# Patient Record
Sex: FEMALE | URBAN - METROPOLITAN AREA
[De-identification: names, ages, dates, MRNs, and addresses within clinical notes are randomized per-mention and may not be internally consistent; named-entity substitution may affect disease eponyms.]

---

## 2022-08-26 ENCOUNTER — OFFICE (OUTPATIENT)
Dept: URBAN - METROPOLITAN AREA CLINIC 90 | Facility: CLINIC | Age: 87
Setting detail: OPHTHALMOLOGY
End: 2022-08-26
Payer: COMMERCIAL

## 2022-08-26 DIAGNOSIS — H25.13: ICD-10-CM

## 2022-08-26 PROBLEM — H40.013 GLAUCOMA SUSPECT, LOW RISK 1-2 FACTORS; BOTH EYES: Status: ACTIVE | Noted: 2022-08-26

## 2022-08-26 PROCEDURE — 92004 COMPRE OPH EXAM NEW PT 1/>: CPT | Performed by: OPHTHALMOLOGY

## 2022-08-26 ASSESSMENT — TONOMETRY
OS_IOP_MMHG: 21
OD_IOP_MMHG: 21

## 2022-08-26 ASSESSMENT — REFRACTION_CURRENTRX
OD_OVR_VA: 20/
OD_CYLINDER: -0.75
OS_AXIS: 093
OS_VPRISM_DIRECTION: BF
OS_OVR_VA: 20/
OD_ADD: +3.75
OS_SPHERE: -4.00
OD_VPRISM_DIRECTION: BF
OS_CYLINDER: -1.00
OS_ADD: +3.75
OD_AXIS: 100
OD_SPHERE: -4.00

## 2022-08-26 ASSESSMENT — KERATOMETRY
OS_K1POWER_DIOPTERS: 42.50
OD_K2POWER_DIOPTERS: 43.75
OD_K1POWER_DIOPTERS: 42.50
METHOD_AUTO_MANUAL: AUTO
OS_K2POWER_DIOPTERS: 43.75
OS_AXISANGLE_DEGREES: 172
OD_AXISANGLE_DEGREES: 008

## 2022-08-26 ASSESSMENT — REFRACTION_AUTOREFRACTION
OS_SPHERE: -2.75
OS_CYLINDER: -5.00
OD_CYLINDER: -2.50
OS_AXIS: 084
OD_SPHERE: -5.25
OD_AXIS: 084

## 2022-08-26 ASSESSMENT — AXIALLENGTH_DERIVED
OD_AL: 26.6029
OS_AL: 25.9977

## 2022-08-26 ASSESSMENT — SPHEQUIV_DERIVED
OD_SPHEQUIV: -6.5
OS_SPHEQUIV: -5.25

## 2022-08-26 ASSESSMENT — CONFRONTATIONAL VISUAL FIELD TEST (CVF)
OD_FINDINGS: FULL
OS_FINDINGS: FULL

## 2022-08-26 ASSESSMENT — VISUAL ACUITY
OS_BCVA: 20/200+1
OD_BCVA: 20/50-2

## 2023-01-04 ENCOUNTER — OFFICE (OUTPATIENT)
Dept: URBAN - METROPOLITAN AREA CLINIC 90 | Facility: CLINIC | Age: 88
Setting detail: OPHTHALMOLOGY
End: 2023-01-04
Payer: COMMERCIAL

## 2023-01-04 DIAGNOSIS — H40.013: ICD-10-CM

## 2023-01-04 DIAGNOSIS — H25.13: ICD-10-CM

## 2023-01-04 PROCEDURE — 92012 INTRM OPH EXAM EST PATIENT: CPT | Performed by: OPHTHALMOLOGY

## 2023-01-04 ASSESSMENT — REFRACTION_CURRENTRX
OS_CYLINDER: -1.00
OS_ADD: +3.75
OD_SPHERE: -4.00
OD_OVR_VA: 20/
OD_VPRISM_DIRECTION: BF
OS_SPHERE: -4.00
OS_AXIS: 093
OS_OVR_VA: 20/
OS_VPRISM_DIRECTION: BF
OD_AXIS: 100
OD_ADD: +3.75
OD_CYLINDER: -0.75

## 2023-01-04 ASSESSMENT — REFRACTION_AUTOREFRACTION
OS_AXIS: 083
OD_AXIS: 085
OD_CYLINDER: -4.00
OS_CYLINDER: -3.75
OD_SPHERE: -3.00
OS_SPHERE: -3.75

## 2023-01-04 ASSESSMENT — SPHEQUIV_DERIVED
OD_SPHEQUIV: -5
OS_SPHEQUIV: -5.625

## 2023-01-04 ASSESSMENT — KERATOMETRY
OD_AXISANGLE_DEGREES: 005
OS_K2POWER_DIOPTERS: 43.75
OS_AXISANGLE_DEGREES: 170
METHOD_AUTO_MANUAL: AUTO
OD_K2POWER_DIOPTERS: 44.00
OD_K1POWER_DIOPTERS: 42.75
OS_K1POWER_DIOPTERS: 42.00

## 2023-01-04 ASSESSMENT — TONOMETRY
OS_IOP_MMHG: 19
OD_IOP_MMHG: 19

## 2023-01-04 ASSESSMENT — AXIALLENGTH_DERIVED
OS_AL: 26.2899
OD_AL: 25.7699

## 2023-01-04 ASSESSMENT — VISUAL ACUITY
OD_BCVA: 20/50-2
OS_BCVA: 20/200+1

## 2023-01-04 ASSESSMENT — CONFRONTATIONAL VISUAL FIELD TEST (CVF)
OS_FINDINGS: FULL
OD_FINDINGS: FULL

## 2023-02-27 ENCOUNTER — AMBULATORY SURGERY CENTER (OUTPATIENT)
Dept: URBAN - METROPOLITAN AREA CLINIC 91 | Facility: CLINIC | Age: 88
Setting detail: OPHTHALMOLOGY
End: 2023-02-27
Payer: COMMERCIAL

## 2023-02-27 DIAGNOSIS — H25.21: ICD-10-CM

## 2023-02-27 PROCEDURE — 66984 XCAPSL CTRC RMVL W/O ECP: CPT | Performed by: OPHTHALMOLOGY

## 2023-02-28 ENCOUNTER — OFFICE (OUTPATIENT)
Dept: URBAN - METROPOLITAN AREA CLINIC 90 | Facility: CLINIC | Age: 88
Setting detail: OPHTHALMOLOGY
End: 2023-02-28
Payer: COMMERCIAL

## 2023-02-28 DIAGNOSIS — Z96.1: ICD-10-CM

## 2023-02-28 PROCEDURE — 99024 POSTOP FOLLOW-UP VISIT: CPT | Performed by: OPHTHALMOLOGY

## 2023-02-28 ASSESSMENT — REFRACTION_CURRENTRX
OS_AXIS: 093
OS_SPHERE: -4.00
OS_CYLINDER: -1.00
OD_VPRISM_DIRECTION: BF
OD_SPHERE: -4.00
OS_OVR_VA: 20/
OS_VPRISM_DIRECTION: BF
OD_AXIS: 100
OD_ADD: +3.75
OD_OVR_VA: 20/
OD_CYLINDER: -0.75
OS_ADD: +3.75

## 2023-02-28 ASSESSMENT — REFRACTION_AUTOREFRACTION
OS_SPHERE: -3.75
OS_AXIS: 083
OD_AXIS: 085
OD_SPHERE: -3.00
OS_CYLINDER: -3.75
OD_CYLINDER: -4.00

## 2023-02-28 ASSESSMENT — VISUAL ACUITY
OD_BCVA: 20/200
OS_BCVA: 20/400

## 2023-02-28 ASSESSMENT — CORNEAL EDEMA - FOLDS/STRIAE: OD_FOLDSSTRIAE: 3+

## 2023-02-28 ASSESSMENT — SPHEQUIV_DERIVED
OD_SPHEQUIV: -5
OS_SPHEQUIV: -5.625

## 2023-03-07 ENCOUNTER — RX ONLY (RX ONLY)
Age: 88
End: 2023-03-07

## 2023-03-07 ENCOUNTER — OFFICE (OUTPATIENT)
Dept: URBAN - METROPOLITAN AREA CLINIC 90 | Facility: CLINIC | Age: 88
Setting detail: OPHTHALMOLOGY
End: 2023-03-07
Payer: COMMERCIAL

## 2023-03-07 DIAGNOSIS — Z96.1: ICD-10-CM

## 2023-03-07 PROBLEM — H25.11 CATARACT SENILE NUCLEAR SCLEROSIS; RIGHT EYE: Status: ACTIVE | Noted: 2023-02-28

## 2023-03-07 PROCEDURE — 99024 POSTOP FOLLOW-UP VISIT: CPT | Performed by: OPHTHALMOLOGY

## 2023-03-07 ASSESSMENT — REFRACTION_CURRENTRX
OS_VPRISM_DIRECTION: BF
OS_ADD: +3.75
OD_CYLINDER: -0.75
OS_AXIS: 093
OS_OVR_VA: 20/
OD_SPHERE: -4.00
OS_CYLINDER: -1.00
OD_VPRISM_DIRECTION: BF
OD_AXIS: 100
OD_ADD: +3.75
OD_OVR_VA: 20/
OS_SPHERE: -4.00

## 2023-03-07 ASSESSMENT — KERATOMETRY
OS_AXISANGLE_DEGREES: 170
METHOD_AUTO_MANUAL: AUTO
OD_K2POWER_DIOPTERS: 44.00
OS_K1POWER_DIOPTERS: 42.00
OS_K2POWER_DIOPTERS: 43.75
OD_K1POWER_DIOPTERS: 42.75
OD_AXISANGLE_DEGREES: 005

## 2023-03-07 ASSESSMENT — SPHEQUIV_DERIVED
OD_SPHEQUIV: -5
OS_SPHEQUIV: -5.625

## 2023-03-07 ASSESSMENT — TONOMETRY: OD_IOP_MMHG: 10

## 2023-03-07 ASSESSMENT — REFRACTION_AUTOREFRACTION
OD_CYLINDER: -4.00
OD_AXIS: 085
OD_SPHERE: -3.00
OS_CYLINDER: -3.75
OS_AXIS: 083
OS_SPHERE: -3.75

## 2023-03-07 ASSESSMENT — CONFRONTATIONAL VISUAL FIELD TEST (CVF)
OD_FINDINGS: FULL
OS_FINDINGS: FULL

## 2023-03-07 ASSESSMENT — AXIALLENGTH_DERIVED
OD_AL: 25.7699
OS_AL: 26.2899

## 2023-03-07 ASSESSMENT — VISUAL ACUITY
OS_BCVA: 20/150-1
OD_BCVA: 20/100-1

## 2023-03-28 ENCOUNTER — OFFICE (OUTPATIENT)
Dept: URBAN - METROPOLITAN AREA CLINIC 90 | Facility: CLINIC | Age: 88
Setting detail: OPHTHALMOLOGY
End: 2023-03-28
Payer: COMMERCIAL

## 2023-03-28 DIAGNOSIS — Z96.1: ICD-10-CM

## 2023-03-28 DIAGNOSIS — H25.12: ICD-10-CM

## 2023-03-28 PROCEDURE — 99024 POSTOP FOLLOW-UP VISIT: CPT | Performed by: OPHTHALMOLOGY

## 2023-03-28 ASSESSMENT — REFRACTION_CURRENTRX
OD_ADD: +3.75
OS_ADD: +3.75
OS_OVR_VA: 20/
OD_AXIS: 100
OS_AXIS: 093
OS_SPHERE: -4.00
OD_VPRISM_DIRECTION: BF
OD_OVR_VA: 20/
OS_CYLINDER: -1.00
OD_SPHERE: -4.00
OS_VPRISM_DIRECTION: BF
OD_CYLINDER: -0.75

## 2023-03-28 ASSESSMENT — CONFRONTATIONAL VISUAL FIELD TEST (CVF)
OS_FINDINGS: FULL
OD_FINDINGS: FULL

## 2023-03-28 ASSESSMENT — KERATOMETRY
OS_K1POWER_DIOPTERS: 42.25
METHOD_AUTO_MANUAL: AUTO
OS_K2POWER_DIOPTERS: 43.75
OD_K2POWER_DIOPTERS: 42.00
OS_AXISANGLE_DEGREES: 175
OD_AXISANGLE_DEGREES: 024
OD_K1POWER_DIOPTERS: 41.00

## 2023-03-28 ASSESSMENT — REFRACTION_AUTOREFRACTION
OS_AXIS: 092
OD_AXIS: 107
OS_CYLINDER: -4.75
OD_CYLINDER: -1.75
OD_SPHERE: -3.00
OS_SPHERE: -3.50

## 2023-03-28 ASSESSMENT — TONOMETRY: OD_IOP_MMHG: 18

## 2023-03-28 ASSESSMENT — SPHEQUIV_DERIVED
OD_SPHEQUIV: -3.875
OS_SPHEQUIV: -5.875

## 2023-03-28 ASSESSMENT — AXIALLENGTH_DERIVED
OS_AL: 26.354
OD_AL: 26.0722

## 2023-03-28 ASSESSMENT — VISUAL ACUITY
OD_BCVA: 20/100-1
OS_BCVA: 20/70+1

## 2023-04-06 ENCOUNTER — OFFICE (OUTPATIENT)
Dept: URBAN - METROPOLITAN AREA CLINIC 90 | Facility: CLINIC | Age: 88
Setting detail: OPHTHALMOLOGY
End: 2023-04-06
Payer: COMMERCIAL

## 2023-04-06 DIAGNOSIS — H25.12: ICD-10-CM

## 2023-04-06 PROCEDURE — 99024 POSTOP FOLLOW-UP VISIT: CPT | Performed by: OPHTHALMOLOGY

## 2023-04-06 PROCEDURE — 92136 OPHTHALMIC BIOMETRY: CPT | Performed by: OPHTHALMOLOGY

## 2023-04-06 ASSESSMENT — KERATOMETRY
OS_AXISANGLE_DEGREES: 175
OD_K2POWER_DIOPTERS: 42.00
OS_K1POWER_DIOPTERS: 42.25
OD_AXISANGLE_DEGREES: 024
OD_K1POWER_DIOPTERS: 41.00
OS_K2POWER_DIOPTERS: 43.75
METHOD_AUTO_MANUAL: AUTO

## 2023-04-06 ASSESSMENT — REFRACTION_CURRENTRX
OS_VPRISM_DIRECTION: BF
OD_CYLINDER: -0.75
OS_AXIS: 093
OS_OVR_VA: 20/
OS_SPHERE: -4.00
OD_AXIS: 100
OS_ADD: +3.75
OD_OVR_VA: 20/
OS_CYLINDER: -1.00
OD_ADD: +3.75
OD_SPHERE: -4.00
OD_VPRISM_DIRECTION: BF

## 2023-04-06 ASSESSMENT — REFRACTION_AUTOREFRACTION
OD_AXIS: 107
OD_SPHERE: -3.00
OS_AXIS: 092
OS_SPHERE: -3.50
OD_CYLINDER: -1.75
OS_CYLINDER: -4.75

## 2023-04-06 ASSESSMENT — SPHEQUIV_DERIVED
OS_SPHEQUIV: -5.875
OD_SPHEQUIV: -3.875

## 2023-04-06 ASSESSMENT — AXIALLENGTH_DERIVED
OS_AL: 26.354
OD_AL: 26.0722

## 2023-04-06 ASSESSMENT — VISUAL ACUITY
OS_BCVA: 20/80+2
OD_BCVA: 20/70

## 2023-04-21 ENCOUNTER — AMBULATORY SURGERY CENTER (OUTPATIENT)
Dept: URBAN - METROPOLITAN AREA SURGERY 25 | Facility: SURGERY | Age: 88
Setting detail: OPHTHALMOLOGY
End: 2023-04-21
Payer: COMMERCIAL

## 2023-04-21 DIAGNOSIS — H25.22: ICD-10-CM

## 2023-04-21 PROCEDURE — 66984 XCAPSL CTRC RMVL W/O ECP: CPT | Performed by: OPHTHALMOLOGY

## 2023-04-22 ENCOUNTER — OFFICE (OUTPATIENT)
Dept: URBAN - METROPOLITAN AREA CLINIC 90 | Facility: CLINIC | Age: 88
Setting detail: OPHTHALMOLOGY
End: 2023-04-22
Payer: COMMERCIAL

## 2023-04-22 DIAGNOSIS — Z96.1: ICD-10-CM

## 2023-04-22 PROCEDURE — 99024 POSTOP FOLLOW-UP VISIT: CPT | Performed by: OPHTHALMOLOGY

## 2023-04-22 ASSESSMENT — REFRACTION_AUTOREFRACTION
OS_AXIS: 092
OD_SPHERE: -3.00
OD_AXIS: 107
OS_CYLINDER: -4.75
OS_SPHERE: -3.50
OD_CYLINDER: -1.75

## 2023-04-22 ASSESSMENT — REFRACTION_CURRENTRX
OS_ADD: +3.75
OS_VPRISM_DIRECTION: BF
OD_AXIS: 100
OS_OVR_VA: 20/
OD_ADD: +3.75
OD_OVR_VA: 20/
OD_CYLINDER: -0.75
OS_CYLINDER: -1.00
OD_VPRISM_DIRECTION: BF
OS_AXIS: 093
OS_SPHERE: -4.00
OD_SPHERE: -4.00

## 2023-04-22 ASSESSMENT — CORNEAL EDEMA - FOLDS/STRIAE: OS_FOLDSSTRIAE: 3+

## 2023-04-22 ASSESSMENT — KERATOMETRY
OD_K2POWER_DIOPTERS: 42.00
OS_AXISANGLE_DEGREES: 175
METHOD_AUTO_MANUAL: AUTO
OD_K1POWER_DIOPTERS: 41.00
OS_K2POWER_DIOPTERS: 43.75
OS_K1POWER_DIOPTERS: 42.25
OD_AXISANGLE_DEGREES: 024

## 2023-04-22 ASSESSMENT — CORNEAL EDEMA CLINICAL DESCRIPTION: OS_CORNEALEDEMA: DEEP FOLDS

## 2023-04-22 ASSESSMENT — AXIALLENGTH_DERIVED
OD_AL: 26.0722
OS_AL: 26.354

## 2023-04-22 ASSESSMENT — VISUAL ACUITY
OS_BCVA: 20/70
OD_BCVA: 20/200

## 2023-04-22 ASSESSMENT — SPHEQUIV_DERIVED
OS_SPHEQUIV: -5.875
OD_SPHEQUIV: -3.875

## 2023-04-28 ENCOUNTER — RX ONLY (RX ONLY)
Age: 88
End: 2023-04-28

## 2023-04-28 ENCOUNTER — OFFICE (OUTPATIENT)
Dept: URBAN - METROPOLITAN AREA CLINIC 90 | Facility: CLINIC | Age: 88
Setting detail: OPHTHALMOLOGY
End: 2023-04-28
Payer: COMMERCIAL

## 2023-04-28 DIAGNOSIS — Z96.1: ICD-10-CM

## 2023-04-28 PROBLEM — H18.591 UNSPECIFIED HEREDITARY CORNEAL DYSTROPHIES ; LEFT EYE: Status: ACTIVE | Noted: 2023-04-28

## 2023-04-28 PROBLEM — H18.593 UNSPECIFIED HEREDITARY CORNEAL DYSTROPHIES ; LEFT EYE: Status: ACTIVE | Noted: 2023-04-28

## 2023-04-28 PROBLEM — H18.592 UNSPECIFIED HEREDITARY CORNEAL DYSTROPHIES ; LEFT EYE: Status: ACTIVE | Noted: 2023-04-28

## 2023-04-28 PROCEDURE — 99024 POSTOP FOLLOW-UP VISIT: CPT | Performed by: OPHTHALMOLOGY

## 2023-04-28 ASSESSMENT — CONFRONTATIONAL VISUAL FIELD TEST (CVF)
OD_FINDINGS: FULL
OS_FINDINGS: FULL

## 2023-04-28 ASSESSMENT — KERATOMETRY
OS_K1POWER_DIOPTERS: 42.25
OD_K1POWER_DIOPTERS: 41.00
OS_AXISANGLE_DEGREES: 175
OS_K2POWER_DIOPTERS: 43.75
OD_K2POWER_DIOPTERS: 42.00
OD_AXISANGLE_DEGREES: 024

## 2023-04-28 ASSESSMENT — VISUAL ACUITY
OD_BCVA: 20/100+1
OS_BCVA: 20/80-1

## 2023-04-28 ASSESSMENT — SPHEQUIV_DERIVED
OS_SPHEQUIV: -5.875
OD_SPHEQUIV: -3.875

## 2023-04-28 ASSESSMENT — REFRACTION_AUTOREFRACTION
OS_AXIS: 092
OD_AXIS: 107
OD_SPHERE: -3.00
OS_CYLINDER: -4.75
OD_CYLINDER: -1.75
OS_SPHERE: -3.50

## 2023-04-28 ASSESSMENT — REFRACTION_CURRENTRX
OD_ADD: +3.75
OD_SPHERE: -4.00
OD_AXIS: 100
OD_OVR_VA: 20/
OS_AXIS: 093
OS_VPRISM_DIRECTION: BF
OD_CYLINDER: -0.75
OD_VPRISM_DIRECTION: BF
OS_OVR_VA: 20/
OS_SPHERE: -4.00
OS_CYLINDER: -1.00
OS_ADD: +3.75

## 2023-04-28 ASSESSMENT — AXIALLENGTH_DERIVED
OS_AL: 26.354
OD_AL: 26.0722

## 2023-04-28 ASSESSMENT — CORNEAL EDEMA - FOLDS/STRIAE: OS_FOLDSSTRIAE: 2+

## 2023-04-28 ASSESSMENT — TONOMETRY: OS_IOP_MMHG: 18

## 2023-05-19 ENCOUNTER — OFFICE (OUTPATIENT)
Dept: URBAN - METROPOLITAN AREA CLINIC 90 | Facility: CLINIC | Age: 88
Setting detail: OPHTHALMOLOGY
End: 2023-05-19
Payer: COMMERCIAL

## 2023-05-19 DIAGNOSIS — Z96.1: ICD-10-CM

## 2023-05-19 PROBLEM — H18.591 UNSPECIFIED HEREDITARY CORNEAL DYSTROPHIES ; LEFT EYE: Status: ACTIVE | Noted: 2023-05-19

## 2023-05-19 PROBLEM — H18.593 UNSPECIFIED HEREDITARY CORNEAL DYSTROPHIES ; LEFT EYE: Status: ACTIVE | Noted: 2023-05-19

## 2023-05-19 PROBLEM — H18.232 SECONDARY CORNEAL EDEMA; LEFT EYE: Status: ACTIVE | Noted: 2023-05-19

## 2023-05-19 PROBLEM — H18.592 UNSPECIFIED HEREDITARY CORNEAL DYSTROPHIES ; LEFT EYE: Status: ACTIVE | Noted: 2023-05-19

## 2023-05-19 PROCEDURE — 99024 POSTOP FOLLOW-UP VISIT: CPT | Performed by: OPHTHALMOLOGY

## 2023-05-19 ASSESSMENT — AXIALLENGTH_DERIVED
OS_AL: 29.04
OD_AL: 24.9051

## 2023-05-19 ASSESSMENT — CONFRONTATIONAL VISUAL FIELD TEST (CVF)
OS_FINDINGS: FULL
OD_FINDINGS: FULL

## 2023-05-19 ASSESSMENT — KERATOMETRY
OD_K1POWER_DIOPTERS: 41.50
OD_AXISANGLE_DEGREES: 021
OS_K1POWER_DIOPTERS: 45.00
OD_K2POWER_DIOPTERS: 43.50
OS_K2POWER_DIOPTERS: 46.00
OS_AXISANGLE_DEGREES: 164
METHOD_AUTO_MANUAL: AUTO

## 2023-05-19 ASSESSMENT — REFRACTION_AUTOREFRACTION
OD_CYLINDER: -3.00
OS_AXIS: 054
OS_SPHERE: -10.75
OD_AXIS: 101
OD_SPHERE: -0.75
OS_CYLINDER: -5.00

## 2023-05-19 ASSESSMENT — REFRACTION_CURRENTRX
OS_OVR_VA: 20/
OS_ADD: +3.75
OD_AXIS: 100
OD_CYLINDER: -0.75
OS_VPRISM_DIRECTION: BF
OD_OVR_VA: 20/
OS_CYLINDER: -1.00
OD_SPHERE: -4.00
OD_ADD: +3.75
OS_SPHERE: -4.00
OD_VPRISM_DIRECTION: BF
OS_AXIS: 093

## 2023-05-19 ASSESSMENT — TONOMETRY
OS_IOP_MMHG: 17
OD_IOP_MMHG: 17

## 2023-05-19 ASSESSMENT — VISUAL ACUITY
OD_BCVA: 20/400
OS_BCVA: 20/70-2

## 2023-05-19 ASSESSMENT — CORNEAL EDEMA CLINICAL DESCRIPTION: OD_CORNEALEDEMA: 1+

## 2023-05-19 ASSESSMENT — CORNEAL EDEMA - MICROCYSTIC EPITHELIAL EDEMA (MCE): OS_MCE: T 1+

## 2023-05-19 ASSESSMENT — SPHEQUIV_DERIVED
OD_SPHEQUIV: -2.25
OS_SPHEQUIV: -13.25

## 2023-05-19 ASSESSMENT — CORNEAL EDEMA - FOLDS/STRIAE
OS_FOLDSSTRIAE: 2+
OD_FOLDSSTRIAE: 1+

## 2023-06-20 ENCOUNTER — OFFICE (OUTPATIENT)
Dept: URBAN - METROPOLITAN AREA CLINIC 90 | Facility: CLINIC | Age: 88
Setting detail: OPHTHALMOLOGY
End: 2023-06-20
Payer: COMMERCIAL

## 2023-06-20 DIAGNOSIS — H18.593: ICD-10-CM

## 2023-06-20 DIAGNOSIS — H18.592: ICD-10-CM

## 2023-06-20 DIAGNOSIS — H40.013: ICD-10-CM

## 2023-06-20 DIAGNOSIS — H18.232: ICD-10-CM

## 2023-06-20 DIAGNOSIS — H18.591: ICD-10-CM

## 2023-06-20 DIAGNOSIS — Z96.1: ICD-10-CM

## 2023-06-20 PROCEDURE — 99024 POSTOP FOLLOW-UP VISIT: CPT | Performed by: OPHTHALMOLOGY

## 2023-06-20 ASSESSMENT — KERATOMETRY
METHOD_AUTO_MANUAL: AUTO
OS_K1POWER_DIOPTERS: 45.00
OD_K1POWER_DIOPTERS: 41.50
OD_AXISANGLE_DEGREES: 021
OS_K2POWER_DIOPTERS: 46.00
OS_AXISANGLE_DEGREES: 164
OD_K2POWER_DIOPTERS: 43.50

## 2023-06-20 ASSESSMENT — REFRACTION_AUTOREFRACTION
OD_SPHERE: -0.75
OD_CYLINDER: -3.00
OD_AXIS: 101
OS_CYLINDER: -5.00
OS_SPHERE: -10.75
OS_AXIS: 054

## 2023-06-20 ASSESSMENT — REFRACTION_CURRENTRX
OS_OVR_VA: 20/
OD_VPRISM_DIRECTION: BF
OS_CYLINDER: -1.00
OS_VPRISM_DIRECTION: BF
OS_SPHERE: -4.00
OD_ADD: +3.75
OD_AXIS: 100
OD_OVR_VA: 20/
OS_AXIS: 093
OD_SPHERE: -4.00
OS_ADD: +3.75
OD_CYLINDER: -0.75

## 2023-06-20 ASSESSMENT — VISUAL ACUITY
OD_BCVA: 20/150
OS_BCVA: 20/100

## 2023-06-20 ASSESSMENT — SPHEQUIV_DERIVED
OS_SPHEQUIV: -13.25
OD_SPHEQUIV: -2.25

## 2023-06-20 ASSESSMENT — AXIALLENGTH_DERIVED
OD_AL: 24.9051
OS_AL: 29.04

## 2023-06-20 ASSESSMENT — CORNEAL EDEMA - FOLDS/STRIAE
OD_FOLDSSTRIAE: 1+ 2+
OS_FOLDSSTRIAE: 1+ 2+

## 2023-06-20 ASSESSMENT — TONOMETRY
OS_IOP_MMHG: 14
OD_IOP_MMHG: 14

## 2023-06-20 ASSESSMENT — CONFRONTATIONAL VISUAL FIELD TEST (CVF)
OS_FINDINGS: FULL
OD_FINDINGS: FULL

## 2023-06-20 ASSESSMENT — CORNEAL EDEMA CLINICAL DESCRIPTION: OS_CORNEALEDEMA: DEEP FOLDS

## 2023-08-22 ENCOUNTER — RX ONLY (RX ONLY)
Age: 88
End: 2023-08-22

## 2023-08-22 ENCOUNTER — OFFICE (OUTPATIENT)
Dept: URBAN - METROPOLITAN AREA CLINIC 90 | Facility: CLINIC | Age: 88
Setting detail: OPHTHALMOLOGY
End: 2023-08-22
Payer: COMMERCIAL

## 2023-08-22 DIAGNOSIS — H18.232: ICD-10-CM

## 2023-08-22 DIAGNOSIS — Z96.1: ICD-10-CM

## 2023-08-22 PROCEDURE — 92012 INTRM OPH EXAM EST PATIENT: CPT | Performed by: OPHTHALMOLOGY

## 2023-08-22 ASSESSMENT — TONOMETRY
OD_IOP_MMHG: 18
OS_IOP_MMHG: 18

## 2023-08-22 ASSESSMENT — KERATOMETRY
OS_K2POWER_DIOPTERS: 43.25
OD_K2POWER_DIOPTERS: 44.75
OD_K1POWER_DIOPTERS: 42.00
METHOD_AUTO_MANUAL: AUTO
OS_AXISANGLE_DEGREES: 161
OD_AXISANGLE_DEGREES: 058
OS_K1POWER_DIOPTERS: 42.75

## 2023-08-22 ASSESSMENT — REFRACTION_CURRENTRX
OD_CYLINDER: -0.75
OS_AXIS: 093
OD_VPRISM_DIRECTION: BF
OD_ADD: +3.75
OD_AXIS: 100
OD_OVR_VA: 20/
OS_VPRISM_DIRECTION: BF
OS_SPHERE: -4.00
OD_SPHERE: -4.00
OS_ADD: +3.75
OS_OVR_VA: 20/
OS_CYLINDER: -1.00

## 2023-08-22 ASSESSMENT — REFRACTION_AUTOREFRACTION
OD_AXIS: 103
OS_CYLINDER: -2.00
OD_CYLINDER: -5.75
OD_SPHERE: 0.00
OS_AXIS: 102
OS_SPHERE: -3.25

## 2023-08-22 ASSESSMENT — VISUAL ACUITY
OS_BCVA: 20/100
OD_BCVA: 20/100-

## 2023-08-22 ASSESSMENT — CONFRONTATIONAL VISUAL FIELD TEST (CVF)
OS_FINDINGS: FULL
OD_FINDINGS: FULL

## 2023-08-22 ASSESSMENT — AXIALLENGTH_DERIVED
OD_AL: 24.8186
OS_AL: 25.5869

## 2023-08-22 ASSESSMENT — CORNEAL EDEMA CLINICAL DESCRIPTION
OS_CORNEALEDEMA: DEEP FOLDS, CENTRALLY
OD_CORNEALEDEMA: DEEP FOLDS, CENTRALLY

## 2023-08-22 ASSESSMENT — SPHEQUIV_DERIVED
OS_SPHEQUIV: -4.25
OD_SPHEQUIV: -2.875

## 2023-08-22 ASSESSMENT — CORNEAL EDEMA - FOLDS/STRIAE
OD_FOLDSSTRIAE: 2+
OS_FOLDSSTRIAE: 2+

## 2023-08-24 ENCOUNTER — OFFICE (OUTPATIENT)
Dept: URBAN - METROPOLITAN AREA CLINIC 90 | Facility: CLINIC | Age: 88
Setting detail: OPHTHALMOLOGY
End: 2023-08-24
Payer: COMMERCIAL

## 2023-08-24 ENCOUNTER — RX ONLY (RX ONLY)
Age: 88
End: 2023-08-24

## 2023-08-24 DIAGNOSIS — H18.233: ICD-10-CM

## 2023-08-24 PROCEDURE — 92012 INTRM OPH EXAM EST PATIENT: CPT | Performed by: OPHTHALMOLOGY

## 2023-08-24 ASSESSMENT — REFRACTION_CURRENTRX
OD_CYLINDER: -0.75
OS_SPHERE: -4.00
OS_OVR_VA: 20/
OD_OVR_VA: 20/
OS_ADD: +3.75
OS_VPRISM_DIRECTION: BF
OD_SPHERE: -4.00
OD_AXIS: 100
OS_AXIS: 093
OS_CYLINDER: -1.00
OD_VPRISM_DIRECTION: BF
OD_ADD: +3.75

## 2023-08-24 ASSESSMENT — AXIALLENGTH_DERIVED
OD_AL: 24.8186
OS_AL: 25.5869

## 2023-08-24 ASSESSMENT — KERATOMETRY
METHOD_AUTO_MANUAL: AUTO
OS_K2POWER_DIOPTERS: 43.25
OD_AXISANGLE_DEGREES: 058
OS_K1POWER_DIOPTERS: 42.75
OS_AXISANGLE_DEGREES: 161
OD_K1POWER_DIOPTERS: 42.00
OD_K2POWER_DIOPTERS: 44.75

## 2023-08-24 ASSESSMENT — CORNEAL EDEMA CLINICAL DESCRIPTION
OD_CORNEALEDEMA: DEEP FOLDS, CENTRALLY
OS_CORNEALEDEMA: DEEP FOLDS, CENTRALLY

## 2023-08-24 ASSESSMENT — CORNEAL EDEMA - MICROCYSTIC EPITHELIAL EDEMA (MCE)
OS_MCE: 2+
OD_MCE: 2+

## 2023-08-24 ASSESSMENT — REFRACTION_AUTOREFRACTION
OD_SPHERE: 0.00
OD_AXIS: 103
OS_AXIS: 102
OD_CYLINDER: -5.75
OS_SPHERE: -3.25
OS_CYLINDER: -2.00

## 2023-08-24 ASSESSMENT — SPHEQUIV_DERIVED
OD_SPHEQUIV: -2.875
OS_SPHEQUIV: -4.25

## 2023-08-24 ASSESSMENT — CONFRONTATIONAL VISUAL FIELD TEST (CVF)
OS_FINDINGS: FULL
OD_FINDINGS: FULL

## 2023-08-24 ASSESSMENT — CORNEAL EDEMA - FOLDS/STRIAE
OD_FOLDSSTRIAE: 2+
OS_FOLDSSTRIAE: 2+

## 2023-08-24 ASSESSMENT — TONOMETRY
OD_IOP_MMHG: 10
OS_IOP_MMHG: 10

## 2023-08-24 ASSESSMENT — VISUAL ACUITY
OS_BCVA: 20/150-1
OD_BCVA: 20/200

## 2023-09-01 ENCOUNTER — OFFICE (OUTPATIENT)
Dept: URBAN - METROPOLITAN AREA CLINIC 90 | Facility: CLINIC | Age: 88
Setting detail: OPHTHALMOLOGY
End: 2023-09-01
Payer: COMMERCIAL

## 2023-09-01 ENCOUNTER — RX ONLY (RX ONLY)
Age: 88
End: 2023-09-01

## 2023-09-01 DIAGNOSIS — H18.233: ICD-10-CM

## 2023-09-01 PROBLEM — H18.599: Status: ACTIVE | Noted: 2023-08-24

## 2023-09-01 PROCEDURE — 99024 POSTOP FOLLOW-UP VISIT: CPT | Performed by: OPHTHALMOLOGY

## 2023-09-01 ASSESSMENT — KERATOMETRY
METHOD_AUTO_MANUAL: AUTO
OD_K1POWER_DIOPTERS: 42.00
OS_AXISANGLE_DEGREES: 161
OD_AXISANGLE_DEGREES: 058
OS_K1POWER_DIOPTERS: 42.75
OD_K2POWER_DIOPTERS: 44.75
OS_K2POWER_DIOPTERS: 43.25

## 2023-09-01 ASSESSMENT — REFRACTION_AUTOREFRACTION
OS_SPHERE: -3.25
OD_AXIS: 103
OS_AXIS: 102
OS_CYLINDER: -2.00
OD_SPHERE: 0.00
OD_CYLINDER: -5.75

## 2023-09-01 ASSESSMENT — VISUAL ACUITY
OD_BCVA: 20/400
OS_BCVA: 20/100

## 2023-09-01 ASSESSMENT — CORNEAL EDEMA - FOLDS/STRIAE
OS_FOLDSSTRIAE: 2+ 3+
OD_FOLDSSTRIAE: 1+ 2+

## 2023-09-01 ASSESSMENT — REFRACTION_CURRENTRX
OD_VPRISM_DIRECTION: BF
OS_SPHERE: -4.00
OD_ADD: +3.75
OD_CYLINDER: -0.75
OS_OVR_VA: 20/
OD_SPHERE: -4.00
OS_ADD: +3.75
OS_AXIS: 093
OS_VPRISM_DIRECTION: BF
OS_CYLINDER: -1.00
OD_OVR_VA: 20/
OD_AXIS: 100

## 2023-09-01 ASSESSMENT — AXIALLENGTH_DERIVED
OD_AL: 24.8186
OS_AL: 25.5869

## 2023-09-01 ASSESSMENT — SPHEQUIV_DERIVED
OS_SPHEQUIV: -4.25
OD_SPHEQUIV: -2.875

## 2023-09-01 ASSESSMENT — CONFRONTATIONAL VISUAL FIELD TEST (CVF)
OD_FINDINGS: FULL
OS_FINDINGS: FULL

## 2023-09-01 ASSESSMENT — CORNEAL EDEMA CLINICAL DESCRIPTION
OD_CORNEALEDEMA: DEEP FOLDS, CENTRALLY
OS_CORNEALEDEMA: DEEP FOLDS, CENTRALLY

## 2023-09-01 ASSESSMENT — CORNEAL EDEMA - MICROCYSTIC EPITHELIAL EDEMA (MCE)
OD_MCE: 2+
OS_MCE: 2+

## 2023-10-04 ENCOUNTER — OFFICE (OUTPATIENT)
Dept: URBAN - METROPOLITAN AREA CLINIC 90 | Facility: CLINIC | Age: 88
Setting detail: OPHTHALMOLOGY
End: 2023-10-04
Payer: COMMERCIAL

## 2023-10-04 ENCOUNTER — RX ONLY (RX ONLY)
Age: 88
End: 2023-10-04

## 2023-10-04 DIAGNOSIS — H18.233: ICD-10-CM

## 2023-10-04 PROBLEM — H18.591 UNSPECIFIED HEREDITARY CORNEAL DYSTROPHIES ; LEFT EYE: Status: ACTIVE | Noted: 2023-10-04

## 2023-10-04 PROBLEM — H18.593 UNSPECIFIED HEREDITARY CORNEAL DYSTROPHIES ; LEFT EYE: Status: ACTIVE | Noted: 2023-10-04

## 2023-10-04 PROBLEM — H18.592 UNSPECIFIED HEREDITARY CORNEAL DYSTROPHIES ; LEFT EYE: Status: ACTIVE | Noted: 2023-10-04

## 2023-10-04 PROCEDURE — 92012 INTRM OPH EXAM EST PATIENT: CPT | Performed by: OPHTHALMOLOGY

## 2023-10-04 ASSESSMENT — REFRACTION_CURRENTRX
OS_VPRISM_DIRECTION: BF
OS_CYLINDER: -1.00
OD_ADD: +3.75
OS_AXIS: 093
OS_OVR_VA: 20/
OS_SPHERE: -4.00
OD_AXIS: 100
OD_OVR_VA: 20/
OS_ADD: +3.75
OD_VPRISM_DIRECTION: BF
OD_SPHERE: -4.00
OD_CYLINDER: -0.75

## 2023-10-04 ASSESSMENT — KERATOMETRY
OS_K1POWER_DIOPTERS: 42.75
OD_AXISANGLE_DEGREES: 058
OD_K2POWER_DIOPTERS: 44.75
OS_AXISANGLE_DEGREES: 161
OD_K1POWER_DIOPTERS: 42.00
OS_K2POWER_DIOPTERS: 43.25
METHOD_AUTO_MANUAL: AUTO

## 2023-10-04 ASSESSMENT — VISUAL ACUITY
OS_BCVA: 20/80-
OD_BCVA: 20/400

## 2023-10-04 ASSESSMENT — REFRACTION_AUTOREFRACTION
OS_CYLINDER: -2.00
OD_AXIS: 103
OS_SPHERE: -3.25
OS_AXIS: 102
OD_SPHERE: 0.00
OD_CYLINDER: -5.75

## 2023-10-04 ASSESSMENT — SPHEQUIV_DERIVED
OS_SPHEQUIV: -4.25
OD_SPHEQUIV: -2.875

## 2023-10-04 ASSESSMENT — AXIALLENGTH_DERIVED
OD_AL: 24.8186
OS_AL: 25.5869

## 2023-10-04 ASSESSMENT — CORNEAL EDEMA - FOLDS/STRIAE
OD_FOLDSSTRIAE: 1+
OS_FOLDSSTRIAE: 2+

## 2023-10-04 ASSESSMENT — CORNEAL EDEMA - MICROCYSTIC EPITHELIAL EDEMA (MCE)
OD_MCE: 2+
OS_MCE: 2+

## 2023-10-04 ASSESSMENT — CONFRONTATIONAL VISUAL FIELD TEST (CVF)
OS_FINDINGS: FULL
OD_FINDINGS: FULL

## 2023-10-04 ASSESSMENT — CORNEAL EDEMA CLINICAL DESCRIPTION
OD_CORNEALEDEMA: DEEP FOLDS, CENTRALLY
OS_CORNEALEDEMA: DEEP FOLDS, CENTRALLY

## 2024-01-09 ENCOUNTER — OFFICE (OUTPATIENT)
Dept: URBAN - METROPOLITAN AREA CLINIC 90 | Facility: CLINIC | Age: 89
Setting detail: OPHTHALMOLOGY
End: 2024-01-09
Payer: COMMERCIAL

## 2024-01-09 DIAGNOSIS — H18.233: ICD-10-CM

## 2024-01-09 DIAGNOSIS — Z96.1: ICD-10-CM

## 2024-01-09 PROCEDURE — 92012 INTRM OPH EXAM EST PATIENT: CPT | Performed by: OPHTHALMOLOGY

## 2024-01-09 ASSESSMENT — REFRACTION_CURRENTRX
OD_OVR_VA: 20/
OS_VPRISM_DIRECTION: BF
OD_VPRISM_DIRECTION: BF
OD_CYLINDER: -0.75
OD_AXIS: 100
OD_SPHERE: -4.00
OS_AXIS: 093
OS_ADD: +3.75
OS_SPHERE: -4.00
OS_CYLINDER: -1.00
OS_OVR_VA: 20/
OD_ADD: +3.75

## 2024-01-09 ASSESSMENT — REFRACTION_AUTOREFRACTION
OS_SPHERE: UNABLE
OD_SPHERE: UNABLE

## 2024-01-09 ASSESSMENT — CORNEAL EDEMA - MICROCYSTIC EPITHELIAL EDEMA (MCE)
OD_MCE: 2+
OS_MCE: 2+

## 2024-01-09 ASSESSMENT — CORNEAL EDEMA CLINICAL DESCRIPTION
OD_CORNEALEDEMA: DEEP FOLDS, CENTRALLY
OS_CORNEALEDEMA: DEEP FOLDS, CENTRALLY

## 2024-01-09 ASSESSMENT — CORNEAL EDEMA - FOLDS/STRIAE
OD_FOLDSSTRIAE: 3+ 4+
OS_FOLDSSTRIAE: 3+ 4+

## 2024-01-18 ENCOUNTER — OFFICE (OUTPATIENT)
Dept: URBAN - METROPOLITAN AREA CLINIC 90 | Facility: CLINIC | Age: 89
Setting detail: OPHTHALMOLOGY
End: 2024-01-18
Payer: COMMERCIAL

## 2024-01-18 DIAGNOSIS — H18.232: ICD-10-CM

## 2024-01-18 PROBLEM — H18.592 UNSPECIFIED HEREDITARY CORNEAL DYSTROPHIES ; LEFT EYE: Status: ACTIVE | Noted: 2024-01-09

## 2024-01-18 PROBLEM — H18.591 UNSPECIFIED HEREDITARY CORNEAL DYSTROPHIES ; LEFT EYE: Status: ACTIVE | Noted: 2024-01-09

## 2024-01-18 PROBLEM — H18.593 UNSPECIFIED HEREDITARY CORNEAL DYSTROPHIES ; LEFT EYE: Status: ACTIVE | Noted: 2024-01-09

## 2024-01-18 PROCEDURE — 99214 OFFICE O/P EST MOD 30 MIN: CPT | Performed by: OPHTHALMOLOGY

## 2024-01-18 ASSESSMENT — CONFRONTATIONAL VISUAL FIELD TEST (CVF)
OS_FINDINGS: FULL
OD_FINDINGS: FULL

## 2024-01-18 ASSESSMENT — REFRACTION_CURRENTRX
OD_ADD: +3.75
OD_AXIS: 100
OD_CYLINDER: -0.75
OS_OVR_VA: 20/
OS_AXIS: 093
OD_SPHERE: -4.00
OS_VPRISM_DIRECTION: BF
OS_SPHERE: -4.00
OD_VPRISM_DIRECTION: BF
OS_CYLINDER: -1.00
OS_ADD: +3.75
OD_OVR_VA: 20/

## 2024-01-18 ASSESSMENT — REFRACTION_AUTOREFRACTION
OD_SPHERE: UNABLE
OS_SPHERE: UNABLE

## 2024-03-19 ENCOUNTER — OFFICE (OUTPATIENT)
Dept: URBAN - METROPOLITAN AREA CLINIC 34 | Facility: CLINIC | Age: 89
Setting detail: OPHTHALMOLOGY
End: 2024-03-19
Payer: COMMERCIAL

## 2024-03-19 DIAGNOSIS — H16.223: ICD-10-CM

## 2024-03-19 DIAGNOSIS — H18.232: ICD-10-CM

## 2024-03-19 PROBLEM — H11.152 PINGUECULA; LEFT EYE: Status: ACTIVE | Noted: 2024-03-19

## 2024-03-19 PROCEDURE — 99214 OFFICE O/P EST MOD 30 MIN: CPT | Performed by: OPHTHALMOLOGY

## 2024-03-19 ASSESSMENT — REFRACTION_CURRENTRX
OD_OVR_VA: 20/
OD_AXIS: 100
OS_VPRISM_DIRECTION: BF
OS_OVR_VA: 20/
OS_AXIS: 093
OD_ADD: +3.75
OS_ADD: +3.75
OD_SPHERE: -4.00
OD_CYLINDER: -0.75
OD_VPRISM_DIRECTION: BF
OS_CYLINDER: -1.00
OS_SPHERE: -4.00

## 2024-05-17 ENCOUNTER — OUTPATIENT HOSPITAL (OUTPATIENT)
Dept: URBAN - METROPOLITAN AREA CLINIC 33 | Facility: CLINIC | Age: 89
Setting detail: OPHTHALMOLOGY
End: 2024-05-17
Payer: COMMERCIAL

## 2024-05-17 ENCOUNTER — OUTPATIENT (OUTPATIENT)
Dept: OUTPATIENT SERVICES | Facility: HOSPITAL | Age: 89
LOS: 1 days | End: 2024-05-17
Payer: MEDICAID

## 2024-05-17 VITALS
WEIGHT: 173.06 LBS | HEART RATE: 54 BPM | OXYGEN SATURATION: 100 % | SYSTOLIC BLOOD PRESSURE: 136 MMHG | DIASTOLIC BLOOD PRESSURE: 56 MMHG | HEIGHT: 58.5 IN | TEMPERATURE: 98 F | RESPIRATION RATE: 11 BRPM

## 2024-05-17 VITALS
RESPIRATION RATE: 16 BRPM | DIASTOLIC BLOOD PRESSURE: 52 MMHG | OXYGEN SATURATION: 100 % | SYSTOLIC BLOOD PRESSURE: 103 MMHG | HEART RATE: 61 BPM

## 2024-05-17 DIAGNOSIS — H18.232: ICD-10-CM

## 2024-05-17 DIAGNOSIS — D13.99 BENIGN NEOPLASM OF ILL-DEFINED SITES WITHIN THE DIGESTIVE SYSTEM: Chronic | ICD-10-CM

## 2024-05-17 DIAGNOSIS — H18.232 SECONDARY CORNEAL EDEMA, LEFT EYE: ICD-10-CM

## 2024-05-17 DIAGNOSIS — Z98.41 CATARACT EXTRACTION STATUS, RIGHT EYE: Chronic | ICD-10-CM

## 2024-05-17 PROCEDURE — 65756 CORNEAL TRNSPL ENDOTHELIAL: CPT | Mod: 53,LT | Performed by: OPHTHALMOLOGY

## 2024-05-17 PROCEDURE — 88305 TISSUE EXAM BY PATHOLOGIST: CPT | Mod: 26

## 2024-05-17 PROCEDURE — 65756 CORNEAL TRNSPL ENDOTHELIAL: CPT | Mod: AS,LT

## 2024-05-17 PROCEDURE — 88312 SPECIAL STAINS GROUP 1: CPT | Mod: 26

## 2024-05-17 RX ORDER — ERGOCALCIFEROL 1.25 MG/1
1 CAPSULE ORAL
Refills: 0 | DISCHARGE

## 2024-05-17 NOTE — ASU DISCHARGE PLAN (ADULT/PEDIATRIC) - CARE PROVIDER_API CALL
Phi Wiley  Ophthalmology  [address] [place] [address], [address]  [place] [address]  Phone: [phone]  Fax: [phone]  Follow Up Time:

## 2024-05-17 NOTE — ASU DISCHARGE PLAN (ADULT/PEDIATRIC) - ASU DC SPECIAL INSTRUCTIONSFT
Take DIAMOX 500 mg twice daily x 5 DAYS, beginning evening of surgery.    Take Tylenol as needed for pain, following dosage directions of bottle.  Apply Maxitrol ointment provided to the eyes twice daily.    Please keep eye shield on until seen in the office.   You may resume your medication regimen as usual.   Please follow up with your Doctors office for your appointment tomorrow.

## 2024-05-17 NOTE — ASU PATIENT PROFILE, ADULT - TOBACCO USE
"Daily Note     Today's date: 3/4/2024  Patient name: Ritesh Abarca  : 2008  MRN: [id number]  Referring provider: Ammy Hartman brenda   Dead " bugs                         Ther Act                                         Modalities                                                              Never smoker

## 2024-05-17 NOTE — ASU PATIENT PROFILE, ADULT - TEACHING/LEARNING OTHER LEARNERS
Department of Anesthesiology  Postprocedure Note    Patient: Tani Quezada  MRN: [id number]  YOB: 1950  Date of evaluation: 3/8/2023      Procedure Summary     Date: 03/08/23 Room / Location: [address] [place] [address]    Anesthesia Start: 5485 Anesthesia Stop: 1400    Procedure: STAGE 2 INTERSTIM Diagnosis:       Urgency of urination      (Urgency of urination [R39.15])    Surgeons: Aidan Ko MD Responsible Provider: Brown Figueroa MD    Anesthesia Type: general ASA Status: 3          Anesthesia Type: No value filed.     Loi Phase I: Loi Score: 10    Loi Phase II: Loi Score: 10      Anesthesia Post Evaluation    Patient location during evaluation: PACU  Patient participation: complete - patient participated  Level of consciousness: awake and alert  Pain score: 0  Airway patency: patent  Nausea & Vomiting: no nausea and no vomiting  Complications: no  Cardiovascular status: hemodynamically stable  Respiratory status: acceptable  Hydration status: euvolemic
son/family

## 2024-05-17 NOTE — ASU PATIENT PROFILE, ADULT - FALL HARM RISK - HARM RISK INTERVENTIONS

## 2024-05-17 NOTE — ASU DISCHARGE PLAN (ADULT/PEDIATRIC) - NS MD DC FALL RISK RISK
For information on Fall & Injury Prevention, visit: [email or web address] OR  [email or web address] OR  https://www.cdc.gov/steadi/patient.html

## 2024-05-18 ENCOUNTER — OFFICE (OUTPATIENT)
Dept: URBAN - METROPOLITAN AREA CLINIC 34 | Facility: CLINIC | Age: 89
Setting detail: OPHTHALMOLOGY
End: 2024-05-18
Payer: COMMERCIAL

## 2024-05-18 DIAGNOSIS — H18.232: ICD-10-CM

## 2024-05-18 DIAGNOSIS — H16.222: ICD-10-CM

## 2024-05-18 DIAGNOSIS — H11.152: ICD-10-CM

## 2024-05-18 DIAGNOSIS — H11.32: ICD-10-CM

## 2024-05-18 LAB — GRAM STN FLD: SIGNIFICANT CHANGE UP

## 2024-05-18 PROCEDURE — 76512 OPH US DX B-SCAN: CPT | Performed by: OPHTHALMOLOGY

## 2024-05-18 PROCEDURE — 99024 POSTOP FOLLOW-UP VISIT: CPT | Mod: 57 | Performed by: OPHTHALMOLOGY

## 2024-05-18 ASSESSMENT — CONFRONTATIONAL VISUAL FIELD TEST (CVF)
OD_FINDINGS: FULL
OS_FINDINGS: FULL

## 2024-05-23 PROBLEM — I10 ESSENTIAL (PRIMARY) HYPERTENSION: Chronic | Status: ACTIVE | Noted: 2024-05-17

## 2024-05-23 PROBLEM — Z87.39 PERSONAL HISTORY OF OTHER DISEASES OF THE MUSCULOSKELETAL SYSTEM AND CONNECTIVE TISSUE: Chronic | Status: ACTIVE | Noted: 2024-05-17

## 2024-05-23 PROBLEM — M19.90 UNSPECIFIED OSTEOARTHRITIS, UNSPECIFIED SITE: Chronic | Status: ACTIVE | Noted: 2024-05-17

## 2024-05-30 ENCOUNTER — OUTPATIENT (OUTPATIENT)
Dept: OUTPATIENT SERVICES | Facility: HOSPITAL | Age: 89
LOS: 1 days | End: 2024-05-30
Payer: MEDICAID

## 2024-05-30 VITALS
DIASTOLIC BLOOD PRESSURE: 55 MMHG | HEART RATE: 62 BPM | RESPIRATION RATE: 14 BRPM | SYSTOLIC BLOOD PRESSURE: 115 MMHG | OXYGEN SATURATION: 100 %

## 2024-05-30 VITALS
TEMPERATURE: 98 F | WEIGHT: 164.91 LBS | HEART RATE: 54 BPM | HEIGHT: 59 IN | SYSTOLIC BLOOD PRESSURE: 137 MMHG | RESPIRATION RATE: 13 BRPM | DIASTOLIC BLOOD PRESSURE: 52 MMHG | OXYGEN SATURATION: 99 %

## 2024-05-30 DIAGNOSIS — H18.232 SECONDARY CORNEAL EDEMA, LEFT EYE: ICD-10-CM

## 2024-05-30 DIAGNOSIS — T86.8482 OTHER COMPLICATIONS OF CORNEAL TRANSPLANT, LEFT EYE: ICD-10-CM

## 2024-05-30 DIAGNOSIS — Z98.41 CATARACT EXTRACTION STATUS, RIGHT EYE: Chronic | ICD-10-CM

## 2024-05-30 DIAGNOSIS — D13.99 BENIGN NEOPLASM OF ILL-DEFINED SITES WITHIN THE DIGESTIVE SYSTEM: Chronic | ICD-10-CM

## 2024-05-30 DIAGNOSIS — Z94.7 CORNEAL TRANSPLANT STATUS: Chronic | ICD-10-CM

## 2024-05-30 PROCEDURE — 88312 SPECIAL STAINS GROUP 1: CPT

## 2024-05-30 PROCEDURE — 87075 CULTR BACTERIA EXCEPT BLOOD: CPT

## 2024-05-30 PROCEDURE — V2785: CPT

## 2024-05-30 PROCEDURE — 88312 SPECIAL STAINS GROUP 1: CPT | Mod: 26

## 2024-05-30 PROCEDURE — 88305 TISSUE EXAM BY PATHOLOGIST: CPT

## 2024-05-30 PROCEDURE — 65756 CORNEAL TRNSPL ENDOTHELIAL: CPT | Mod: AS,LT

## 2024-05-30 PROCEDURE — 65756 CORNEAL TRNSPL ENDOTHELIAL: CPT | Mod: LT

## 2024-05-30 PROCEDURE — 87070 CULTURE OTHR SPECIMN AEROBIC: CPT

## 2024-05-30 PROCEDURE — 88305 TISSUE EXAM BY PATHOLOGIST: CPT | Mod: 26

## 2024-05-30 RX ORDER — ATENOLOL 25 MG/1
1 TABLET ORAL
Refills: 0 | DISCHARGE

## 2024-05-30 RX ORDER — CALCIUM CARBONATE 500(1250)
1 TABLET ORAL
Refills: 0 | DISCHARGE

## 2024-05-30 RX ORDER — AMLODIPINE BESYLATE 2.5 MG/1
1 TABLET ORAL
Refills: 0 | DISCHARGE

## 2024-05-30 RX ORDER — DICLOFENAC SODIUM 75 MG/1
1 TABLET, DELAYED RELEASE ORAL
Refills: 0 | DISCHARGE

## 2024-05-30 NOTE — ASU PATIENT PROFILE, ADULT - VISION (WITH CORRECTIVE LENSES IF THE PATIENT USUALLY WEARS THEM):
Left eye is cloudy./Partially impaired: cannot see medication labels or newsprint, but can see obstacles in path, and the surrounding layout; can count fingers at arm's length

## 2024-05-30 NOTE — ASU DISCHARGE PLAN (ADULT/PEDIATRIC) - PATIENT EDUCATION MATERIALS PROVIED
Eye shield with instructions , sunglasses and eye kit given to patient. Pink pillow given for head positioning/Other (specify)

## 2024-05-30 NOTE — ASU DISCHARGE PLAN (ADULT/PEDIATRIC) - DO NOT DRIVE IF TAKING PAIN MEDICATION
Impression: Keratoconjunctivitis sicca, bilateral: B86.200. Plan: Patient advised that dry eyes may be the cause of symptoms. Advised to use artificial tears as needed PF and advised to start Cyclosporine twice daily in both eyes. NULL

## 2024-05-30 NOTE — ASU DISCHARGE PLAN (ADULT/PEDIATRIC) - ASU DC SPECIAL INSTRUCTIONSFT
Please maintain FLAT ON BACK position as much as possible with short breaks for bathroom and meals.  Please keep eye shield on until seen in the office.   NO rubbing the eyes.  Take oral DIAMOX at night as prescribed.  You may resume your medication regimen as usual.   Take Tylenol as needed for pain, following dosage directions on the bottle.   Please follow up with your Doctors office for your appointment tomorrow.

## 2024-05-30 NOTE — ASU PATIENT PROFILE, ADULT - NSICDXPASTSURGICALHX_GEN_ALL_CORE_FT
PAST SURGICAL HISTORY:  Benign tumor of intestine     History of bilateral cataract extraction     History of corneal transplant left eye

## 2024-05-30 NOTE — ASU DISCHARGE PLAN (ADULT/PEDIATRIC) - NS MD DC FALL RISK RISK
For information on Fall & Injury Prevention, visit: [email or web address] OR  [email or web address] OR  https://www.cdc.gov/steadi/patient.html

## 2024-05-30 NOTE — ASU PATIENT PROFILE, ADULT - FALL HARM RISK - HARM RISK INTERVENTIONS

## 2024-05-30 NOTE — ASU DISCHARGE PLAN (ADULT/PEDIATRIC) - CARE PROVIDER_API CALL
Phi Wiley  Ophthalmology  [address] [place] [address], [address]  [place] [address]  Phone: [phone]  Fax: [phone]  Follow Up Time:

## 2024-05-30 NOTE — ASU PATIENT PROFILE, ADULT - NSSUBSTANCEUSE_GEN_ALL_CORE_SD
caffeine Glycopyrrolate Pregnancy And Lactation Text: This medication is Pregnancy Category B and is considered safe during pregnancy. It is unknown if it is excreted breast milk.

## 2024-05-31 PROBLEM — H16.222 DRY EYE SYNDROME K SICCA;  , LEFT EYE: Status: ACTIVE | Noted: 2024-05-18

## 2024-05-31 PROBLEM — H11.32 SUBCONJUNCTIVAL HEMORRHAGE; LEFT EYE: Status: ACTIVE | Noted: 2024-05-18

## 2024-05-31 LAB — GRAM STN FLD: SIGNIFICANT CHANGE UP

## 2024-06-03 ENCOUNTER — OFFICE (OUTPATIENT)
Dept: URBAN - METROPOLITAN AREA CLINIC 34 | Facility: CLINIC | Age: 89
Setting detail: OPHTHALMOLOGY
End: 2024-06-03
Payer: COMMERCIAL

## 2024-06-03 DIAGNOSIS — H18.232: ICD-10-CM

## 2024-06-03 PROCEDURE — 99024 POSTOP FOLLOW-UP VISIT: CPT | Performed by: OPHTHALMOLOGY

## 2024-06-03 ASSESSMENT — CONFRONTATIONAL VISUAL FIELD TEST (CVF)
OD_FINDINGS: FULL
OS_FINDINGS: FULL

## 2024-06-07 LAB
CULTURE RESULTS: SIGNIFICANT CHANGE UP
SPECIMEN SOURCE: SIGNIFICANT CHANGE UP

## 2024-06-18 ENCOUNTER — RX ONLY (RX ONLY)
Age: 89
End: 2024-06-18

## 2024-06-18 ENCOUNTER — OFFICE (OUTPATIENT)
Dept: URBAN - METROPOLITAN AREA CLINIC 34 | Facility: CLINIC | Age: 89
Setting detail: OPHTHALMOLOGY
End: 2024-06-18
Payer: COMMERCIAL

## 2024-06-18 DIAGNOSIS — H18.232: ICD-10-CM

## 2024-06-18 PROCEDURE — 99024 POSTOP FOLLOW-UP VISIT: CPT | Performed by: OPHTHALMOLOGY

## 2024-06-18 ASSESSMENT — CONFRONTATIONAL VISUAL FIELD TEST (CVF)
OS_FINDINGS: FULL
OD_FINDINGS: FULL

## 2024-06-20 LAB
CULTURE RESULTS: SIGNIFICANT CHANGE UP
SPECIMEN SOURCE: SIGNIFICANT CHANGE UP

## 2024-07-02 ENCOUNTER — OFFICE (OUTPATIENT)
Dept: URBAN - METROPOLITAN AREA CLINIC 34 | Facility: CLINIC | Age: 89
Setting detail: OPHTHALMOLOGY
End: 2024-07-02
Payer: COMMERCIAL

## 2024-07-02 DIAGNOSIS — H18.232: ICD-10-CM

## 2024-07-02 PROBLEM — H16.221 DRY EYE SYNDROME K SICCA;  ,, RIGHT EYE: Status: ACTIVE | Noted: 2024-06-18

## 2024-07-02 PROCEDURE — 99024 POSTOP FOLLOW-UP VISIT: CPT | Performed by: OPHTHALMOLOGY

## 2024-07-02 ASSESSMENT — CONFRONTATIONAL VISUAL FIELD TEST (CVF)
OS_FINDINGS: FULL
OD_FINDINGS: FULL

## 2024-08-06 ENCOUNTER — RX ONLY (RX ONLY)
Age: 89
End: 2024-08-06

## 2024-08-06 ENCOUNTER — DOCTOR'S OFFICE (OUTPATIENT)
Age: 89
Setting detail: OPHTHALMOLOGY
End: 2024-08-06
Payer: COMMERCIAL

## 2024-08-06 DIAGNOSIS — H18.232: ICD-10-CM

## 2024-08-06 PROCEDURE — 99214 OFFICE O/P EST MOD 30 MIN: CPT | Performed by: OPHTHALMOLOGY

## 2024-08-06 ASSESSMENT — CONFRONTATIONAL VISUAL FIELD TEST (CVF)
OS_FINDINGS: FULL
OD_FINDINGS: FULL

## 2024-08-12 NOTE — ASU DISCHARGE PLAN (ADULT/PEDIATRIC) - FREQUENT HAND WASHING PREVENTS THE SPREAD OF INFECTION.
[Former] : former [Never] : never [TextBox_4] : He is a 71-year-old former smoker (stopped 20 years ago) with a history of DVT, recurrent and left lower extremity, who was referred here for abnormal CT findings.  He is feeling well.  He is not using any inhalers.  He walks 1 to 2 miles per day regularly.  His exercise is limited by knee pain. [YearQuit] : 2003 [Awakes Unrefreshed] : does not awaken unrefreshed Statement Selected

## 2024-08-26 ENCOUNTER — DOCTOR'S OFFICE (OUTPATIENT)
Age: 89
Setting detail: OPHTHALMOLOGY
End: 2024-08-26
Payer: COMMERCIAL

## 2024-08-26 DIAGNOSIS — H18.232: ICD-10-CM

## 2024-08-26 DIAGNOSIS — H18.231: ICD-10-CM

## 2024-08-26 PROCEDURE — 92012 INTRM OPH EXAM EST PATIENT: CPT | Performed by: OPHTHALMOLOGY

## 2024-09-06 ENCOUNTER — ASC (OUTPATIENT)
Dept: URBAN - METROPOLITAN AREA SURGERY 8 | Facility: SURGERY | Age: 89
Setting detail: OPHTHALMOLOGY
End: 2024-09-06
Payer: COMMERCIAL

## 2024-09-06 DIAGNOSIS — H18.232: ICD-10-CM

## 2024-09-06 PROCEDURE — 65870 INCISE INNER EYE ADHESIONS: CPT | Mod: LT | Performed by: OPHTHALMOLOGY

## 2024-09-07 ENCOUNTER — RX ONLY (RX ONLY)
Age: 89
End: 2024-09-07

## 2024-09-07 ENCOUNTER — DOCTOR'S OFFICE (OUTPATIENT)
Facility: LOCATION | Age: 89
Setting detail: OPHTHALMOLOGY
End: 2024-09-07
Payer: COMMERCIAL

## 2024-09-07 DIAGNOSIS — H18.232: ICD-10-CM

## 2024-09-07 PROCEDURE — 99024 POSTOP FOLLOW-UP VISIT: CPT | Performed by: OPHTHALMOLOGY

## 2024-09-13 ENCOUNTER — DOCTOR'S OFFICE (OUTPATIENT)
Facility: LOCATION | Age: 89
Setting detail: OPHTHALMOLOGY
End: 2024-09-13
Payer: COMMERCIAL

## 2024-09-13 DIAGNOSIS — H18.232: ICD-10-CM

## 2024-09-13 PROBLEM — H18.231 SECONDARY CORNEAL EDEMA; RIGHT EYE: Status: ACTIVE | Noted: 2024-09-13

## 2024-09-13 PROCEDURE — 99024 POSTOP FOLLOW-UP VISIT: CPT | Performed by: OPHTHALMOLOGY

## 2024-10-14 ENCOUNTER — DOCTOR'S OFFICE (OUTPATIENT)
Facility: LOCATION | Age: 89
Setting detail: OPHTHALMOLOGY
End: 2024-10-14
Payer: COMMERCIAL

## 2024-10-14 DIAGNOSIS — H18.232: ICD-10-CM

## 2024-10-14 DIAGNOSIS — H18.231: ICD-10-CM

## 2024-10-14 PROCEDURE — 99214 OFFICE O/P EST MOD 30 MIN: CPT | Performed by: OPHTHALMOLOGY

## 2024-10-14 ASSESSMENT — REFRACTION_AUTOREFRACTION
OS_AXIS: 021
OD_SPHERE: UNABLE
OS_SPHERE: -4.00
OS_CYLINDER: +2.25

## 2024-10-14 ASSESSMENT — REFRACTION_MANIFEST
OS_AXIS: 175
OS_SPHERE: -3.50
OS_VA1: 20/50-2
OS_CYLINDER: +3.00

## 2024-10-14 ASSESSMENT — REFRACTION_CURRENTRX
OS_SPHERE: -4.00
OS_OVR_VA: 20/
OS_CYLINDER: -1.00
OD_OVR_VA: 20/
OS_AXIS: 093
OD_AXIS: 100
OS_VPRISM_DIRECTION: BF
OD_ADD: +3.75
OD_CYLINDER: -0.75
OS_ADD: +3.75
OD_SPHERE: -4.00
OD_VPRISM_DIRECTION: BF

## 2024-10-14 ASSESSMENT — CONFRONTATIONAL VISUAL FIELD TEST (CVF)
OS_FINDINGS: FULL
OD_FINDINGS: FULL

## 2024-10-14 ASSESSMENT — KERATOMETRY
OS_K2POWER_DIOPTERS: 43.25
METHOD_AUTO_MANUAL: AUTO
OD_K1POWER_DIOPTERS: UNABLE
OS_AXISANGLE_DEGREES: 0445
OS_K1POWER_DIOPTERS: 41.25

## 2024-10-14 ASSESSMENT — VISUAL ACUITY
OS_BCVA: 20/400
OD_BCVA: 20/50-

## 2024-10-14 ASSESSMENT — CORNEAL EDEMA - MICROCYSTIC EPITHELIAL EDEMA (MCE): OD_MCE: 3+

## 2024-10-14 ASSESSMENT — CORNEAL EDEMA - FOLDS/STRIAE: OD_FOLDSSTRIAE: 1+ 2+

## 2024-10-14 ASSESSMENT — TONOMETRY
OD_IOP_MMHG: 20
OS_IOP_MMHG: 16

## 2024-10-28 ENCOUNTER — OUTPATIENT HOSPITAL (OUTPATIENT)
Dept: URBAN - METROPOLITAN AREA CLINIC 33 | Facility: CLINIC | Age: 89
Setting detail: OPHTHALMOLOGY
End: 2024-10-28
Payer: COMMERCIAL

## 2024-10-28 ENCOUNTER — OUTPATIENT (OUTPATIENT)
Dept: OUTPATIENT SERVICES | Facility: HOSPITAL | Age: 89
LOS: 1 days | End: 2024-10-28
Payer: MEDICAID

## 2024-10-28 VITALS
HEART RATE: 57 BPM | DIASTOLIC BLOOD PRESSURE: 52 MMHG | OXYGEN SATURATION: 96 % | RESPIRATION RATE: 17 BRPM | SYSTOLIC BLOOD PRESSURE: 90 MMHG

## 2024-10-28 VITALS
DIASTOLIC BLOOD PRESSURE: 63 MMHG | RESPIRATION RATE: 20 BRPM | HEIGHT: 59 IN | SYSTOLIC BLOOD PRESSURE: 124 MMHG | HEART RATE: 64 BPM | TEMPERATURE: 97 F | WEIGHT: 173.72 LBS | OXYGEN SATURATION: 99 %

## 2024-10-28 DIAGNOSIS — Z94.7 CORNEAL TRANSPLANT STATUS: Chronic | ICD-10-CM

## 2024-10-28 DIAGNOSIS — Z98.41 CATARACT EXTRACTION STATUS, RIGHT EYE: Chronic | ICD-10-CM

## 2024-10-28 DIAGNOSIS — D13.99 BENIGN NEOPLASM OF ILL-DEFINED SITES WITHIN THE DIGESTIVE SYSTEM: Chronic | ICD-10-CM

## 2024-10-28 DIAGNOSIS — H18.231 SECONDARY CORNEAL EDEMA, RIGHT EYE: ICD-10-CM

## 2024-10-28 DIAGNOSIS — H18.231: ICD-10-CM

## 2024-10-28 PROCEDURE — 65756 CORNEAL TRNSPL ENDOTHELIAL: CPT | Mod: 79,RT | Performed by: OPHTHALMOLOGY

## 2024-10-28 PROCEDURE — 87075 CULTR BACTERIA EXCEPT BLOOD: CPT

## 2024-10-28 PROCEDURE — 65756 CORNEAL TRNSPL ENDOTHELIAL: CPT | Mod: AS,RT

## 2024-10-28 PROCEDURE — 65756 CORNEAL TRNSPL ENDOTHELIAL: CPT | Mod: RT

## 2024-10-28 PROCEDURE — V2785: CPT

## 2024-10-28 PROCEDURE — 88312 SPECIAL STAINS GROUP 1: CPT | Mod: 26

## 2024-10-28 PROCEDURE — 88312 SPECIAL STAINS GROUP 1: CPT

## 2024-10-28 PROCEDURE — 88305 TISSUE EXAM BY PATHOLOGIST: CPT | Mod: 26

## 2024-10-28 PROCEDURE — 88305 TISSUE EXAM BY PATHOLOGIST: CPT

## 2024-10-28 PROCEDURE — 87070 CULTURE OTHR SPECIMN AEROBIC: CPT

## 2024-10-28 RX ORDER — ACETAZOLAMIDE EXTENDED-RELEASE 500 MG/1
500 CAPSULE ORAL AT BEDTIME
Refills: 0 | Status: DISCONTINUED | OUTPATIENT
Start: 2024-10-28 | End: 2024-11-11

## 2024-10-28 RX ORDER — ACETAMINOPHEN 500 MG
2 TABLET ORAL
Refills: 0 | DISCHARGE

## 2024-10-28 NOTE — ASU DISCHARGE PLAN (ADULT/PEDIATRIC) - CARE PROVIDER_API CALL
Phi Wiley  Ophthalmology  [address] [place] [address], [address]  [place] [address]  Phone: [phone]  Fax: [phone]  Follow Up Time:    Phi Wiley  Ophthalmology  [address] [place] [address], [address]  [place] [address]  Phone: [phone]  Fax: [phone]  Scheduled Appointment: 10/29/2024

## 2024-10-28 NOTE — ASU PATIENT PROFILE, ADULT - NSICDXPASTSURGICALHX_GEN_ALL_CORE_FT
PAST SURGICAL HISTORY:  Benign tumor of intestine     History of bilateral cataract extraction Intraocular lens implant (IOL)    History of corneal transplant left eye

## 2024-10-28 NOTE — ASU DISCHARGE PLAN (ADULT/PEDIATRIC) - CALL YOUR DOCTOR IF YOU HAVE ANY OF THE FOLLOWING:
Bleeding that does not stop/Swelling that gets worse/Pain not relieved by Medications/Fever greater than (need to indicate Fahrenheit or Celsius)/Wound/Surgical Site with redness, or foul smelling discharge or pus/Nausea and vomiting that does not stop/Unable to urinate/Increased irritability or sluggishness

## 2024-10-28 NOTE — ASU DISCHARGE PLAN (ADULT/PEDIATRIC) - NS MD DC FALL RISK RISK
For information on Fall & Injury Prevention, visit: [email or web address] OR  [email or web address]-avoid-injury OR  https://www.cdc.gov/steadi/patient.html

## 2024-10-28 NOTE — ASU DISCHARGE PLAN (ADULT/PEDIATRIC) - ASU DC SPECIAL INSTRUCTIONSFT
Please maintain FACE DOWN position as much as possible with short breaks for bathroom and meals for TWO DAYS.  Take DIAMOX 500 mg by mouth tonight.  Please keep eye shield on until seen in the office.   You may resume your medication regimen as usual.   Please follow up with your Doctors office for your appointment tomorrow. Please maintain FACE UP, FLAT ON BACK position as much as possible with short breaks for bathroom and meals for TWO DAYS.  Take DIAMOX 500 mg by mouth tonight.  Please keep eye shield on until seen in the office.   You may resume your medication regimen as usual.   Please follow up with your Doctors office for your appointment tomorrow.

## 2024-10-28 NOTE — ASU PATIENT PROFILE, ADULT - FALL HARM RISK - HARM RISK INTERVENTIONS
Assistance with ambulation/Assistance OOB with selected safe patient handling equipment/Communicate Risk of Fall with Harm to all staff/Discuss with provider need for PT consult/Monitor gait and stability/Provide patient with walking aids - walker, cane, crutches/Reinforce activity limits and safety measures with patient and family/Review medications for side effects contributing to fall risk/Tailored Fall Risk Interventions/Visual Cue: Yellow wristband and red socks/Bed in lowest position, wheels locked, appropriate side rails in place/Call bell, personal items and telephone in reach/Instruct patient to call for assistance before getting out of bed or chair/Non-slip footwear when patient is out of bed/[place] to call system/Physically safe environment - no spills, clutter or unnecessary equipment/Purposeful Proactive Rounding/Room/bathroom lighting operational, light cord in reach

## 2024-10-28 NOTE — ASU DISCHARGE PLAN (ADULT/PEDIATRIC) - PATIENT EDUCATION MATERIALS PROVIED
Eye shield with instructions , sunglasses and eye kit given to patient. Pink pilow for head positioning./Other (specify) Eye shield with instructions , sunglasses and eye kit given to patient. Pink pillow for head positioning./Other (specify)

## 2024-10-28 NOTE — ASU DISCHARGE PLAN (ADULT/PEDIATRIC) - FINANCIAL ASSISTANCE
[facility] provides services at a reduced cost to those who are determined to be eligible through [facility]’s financial assistance program. Information regarding [facility]’s financial assistance program can be found by going to [email or web address] or by calling [phone].

## 2024-10-29 ENCOUNTER — DOCTOR'S OFFICE (OUTPATIENT)
Facility: LOCATION | Age: 89
Setting detail: OPHTHALMOLOGY
End: 2024-10-29
Payer: COMMERCIAL

## 2024-10-29 DIAGNOSIS — H18.231: ICD-10-CM

## 2024-10-29 DIAGNOSIS — H11.31: ICD-10-CM

## 2024-10-29 PROCEDURE — 99024 POSTOP FOLLOW-UP VISIT: CPT | Performed by: OPHTHALMOLOGY

## 2024-10-29 ASSESSMENT — KERATOMETRY
OD_K1POWER_DIOPTERS: UNABLE
OS_AXISANGLE_DEGREES: 0445
METHOD_AUTO_MANUAL: AUTO
OS_K1POWER_DIOPTERS: 41.25
OS_K2POWER_DIOPTERS: 43.25

## 2024-10-29 ASSESSMENT — REFRACTION_CURRENTRX
OS_CYLINDER: -1.00
OD_AXIS: 100
OS_SPHERE: -4.00
OS_AXIS: 093
OD_ADD: +3.75
OD_VPRISM_DIRECTION: BF
OD_OVR_VA: 20/
OS_OVR_VA: 20/
OD_CYLINDER: -0.75
OS_VPRISM_DIRECTION: BF
OS_ADD: +3.75
OD_SPHERE: -4.00

## 2024-10-29 ASSESSMENT — REFRACTION_MANIFEST
OS_VA1: 20/50-2
OS_AXIS: 175
OS_CYLINDER: +3.00
OS_SPHERE: -3.50

## 2024-10-29 ASSESSMENT — REFRACTION_AUTOREFRACTION
OS_AXIS: 021
OS_CYLINDER: +2.25
OD_SPHERE: UNABLE
OS_SPHERE: -4.00

## 2024-10-29 ASSESSMENT — VISUAL ACUITY
OS_BCVA: CF @4FT
OD_BCVA: 20/70

## 2024-10-31 ENCOUNTER — DOCTOR'S OFFICE (OUTPATIENT)
Facility: LOCATION | Age: 89
Setting detail: OPHTHALMOLOGY
End: 2024-10-31
Payer: COMMERCIAL

## 2024-10-31 DIAGNOSIS — H18.231: ICD-10-CM

## 2024-10-31 PROBLEM — H18.232 SECONDARY CORNEAL EDEMA; LEFT EYE: Status: ACTIVE | Noted: 2024-10-31

## 2024-10-31 PROBLEM — H11.31 SUBCONJUNCTIVAL HEMORRHAGE; RIGHT EYE: Status: ACTIVE | Noted: 2024-10-29

## 2024-10-31 PROCEDURE — 92071 CONTACT LENS FITTING FOR TX: CPT | Performed by: OPHTHALMOLOGY

## 2024-10-31 PROCEDURE — 99024 POSTOP FOLLOW-UP VISIT: CPT | Performed by: OPHTHALMOLOGY

## 2024-10-31 ASSESSMENT — REFRACTION_CURRENTRX
OD_SPHERE: -4.00
OS_VPRISM_DIRECTION: BF
OD_CYLINDER: -0.75
OS_CYLINDER: -1.00
OD_VPRISM_DIRECTION: BF
OS_OVR_VA: 20/
OS_AXIS: 093
OD_OVR_VA: 20/
OS_SPHERE: -4.00
OD_AXIS: 100
OD_ADD: +3.75
OS_ADD: +3.75

## 2024-10-31 ASSESSMENT — REFRACTION_MANIFEST
OS_AXIS: 175
OS_VA1: 20/50-2
OS_CYLINDER: +3.00
OS_SPHERE: -3.50

## 2024-10-31 ASSESSMENT — VISUAL ACUITY
OD_BCVA: 20/70
OS_BCVA: CF @4FT

## 2024-11-04 ENCOUNTER — RX ONLY (RX ONLY)
Age: 89
End: 2024-11-04

## 2024-11-04 ENCOUNTER — DOCTOR'S OFFICE (OUTPATIENT)
Facility: LOCATION | Age: 89
Setting detail: OPHTHALMOLOGY
End: 2024-11-04
Payer: COMMERCIAL

## 2024-11-04 DIAGNOSIS — Z94.7: ICD-10-CM

## 2024-11-04 PROCEDURE — 92132 CPTRZD OPH DX IMG ANT SGM: CPT | Performed by: OPHTHALMOLOGY

## 2024-11-04 PROCEDURE — 99213 OFFICE O/P EST LOW 20 MIN: CPT | Performed by: OPHTHALMOLOGY

## 2024-11-04 ASSESSMENT — KERATOMETRY
OS_AXISANGLE_DEGREES: 014
OD_AXISANGLE_DEGREES: 173
METHOD_AUTO_MANUAL: AUTO
OD_K2POWER_DIOPTERS: 42.25
OD_K1POWER_DIOPTERS: 39.75
OS_K1POWER_DIOPTERS: 40.00
OS_K2POWER_DIOPTERS: 42.50

## 2024-11-04 ASSESSMENT — REFRACTION_CURRENTRX
OD_ADD: +3.75
OS_VPRISM_DIRECTION: BF
OD_AXIS: 100
OD_VPRISM_DIRECTION: BF
OD_OVR_VA: 20/
OS_SPHERE: -4.00
OS_ADD: +3.75
OD_CYLINDER: -0.75
OS_AXIS: 093
OS_OVR_VA: 20/
OD_SPHERE: -4.00
OS_CYLINDER: -1.00

## 2024-11-04 ASSESSMENT — REFRACTION_MANIFEST
OS_AXIS: 175
OS_CYLINDER: +3.00
OS_VA1: 20/50-2
OS_SPHERE: -3.50

## 2024-11-04 ASSESSMENT — VISUAL ACUITY
OD_BCVA: 20/70+2
OS_BCVA: CF @4FT

## 2024-11-04 ASSESSMENT — REFRACTION_AUTOREFRACTION
OD_SPHERE: -19.00
OS_AXIS: 015
OS_SPHERE: -4.00
OS_CYLINDER: +3.25
OD_AXIS: 012
OD_CYLINDER: +0.25

## 2024-11-04 ASSESSMENT — CONFRONTATIONAL VISUAL FIELD TEST (CVF)
OS_FINDINGS: FULL
OD_FINDINGS: FULL

## 2024-11-05 ENCOUNTER — TELEHEALTH-OTHER THEN HOME (OUTPATIENT)
Dept: URBAN - METROPOLITAN AREA CLINIC 71 | Facility: CLINIC | Age: 89
Setting detail: OPHTHALMOLOGY
End: 2024-11-05
Payer: COMMERCIAL

## 2024-11-05 DIAGNOSIS — Z94.7: ICD-10-CM

## 2024-11-05 PROCEDURE — 99213 OFFICE O/P EST LOW 20 MIN: CPT | Performed by: OPHTHALMOLOGY

## 2024-11-05 ASSESSMENT — CONFRONTATIONAL VISUAL FIELD TEST (CVF)
OS_FINDINGS: FULL
OD_FINDINGS: FULL

## 2024-11-11 ASSESSMENT — REFRACTION_AUTOREFRACTION
OD_AXIS: 012
OS_CYLINDER: +3.25
OD_CYLINDER: +0.25
OS_SPHERE: -4.00
OD_SPHERE: -19.00
OS_AXIS: 015

## 2024-11-11 ASSESSMENT — REFRACTION_MANIFEST
OS_CYLINDER: +3.00
OS_AXIS: 175
OS_SPHERE: -3.50
OS_VA1: 20/50-2

## 2024-11-11 ASSESSMENT — REFRACTION_CURRENTRX
OS_ADD: +3.75
OD_AXIS: 100
OS_AXIS: 093
OS_CYLINDER: -1.00
OD_CYLINDER: -0.75
OS_VPRISM_DIRECTION: BF
OS_SPHERE: -4.00
OS_OVR_VA: 20/
OD_ADD: +3.75
OD_SPHERE: -4.00
OD_OVR_VA: 20/
OD_VPRISM_DIRECTION: BF

## 2024-11-11 ASSESSMENT — VISUAL ACUITY
OD_BCVA: 20/70+2
OS_BCVA: CF @4FT

## 2024-11-11 ASSESSMENT — KERATOMETRY
OS_K1POWER_DIOPTERS: 40.00
OD_K1POWER_DIOPTERS: 39.75
OD_K2POWER_DIOPTERS: 42.25
OS_AXISANGLE_DEGREES: 014
METHOD_AUTO_MANUAL: AUTO
OS_K2POWER_DIOPTERS: 42.50
OD_AXISANGLE_DEGREES: 173

## 2024-11-14 ENCOUNTER — AMBULATORY SURGERY CENTER (OUTPATIENT)
Dept: URBAN - METROPOLITAN AREA SURGERY 7 | Facility: SURGERY | Age: 89
Setting detail: OPHTHALMOLOGY
End: 2024-11-14
Payer: COMMERCIAL

## 2024-11-14 DIAGNOSIS — H18.11: ICD-10-CM

## 2024-11-14 DIAGNOSIS — Z94.7: ICD-10-CM

## 2024-11-14 PROCEDURE — 66020 INJECTION TREATMENT OF EYE: CPT | Mod: 79,RT | Performed by: OPHTHALMOLOGY

## 2024-11-15 ENCOUNTER — DOCTOR'S OFFICE (OUTPATIENT)
Facility: LOCATION | Age: 89
Setting detail: OPHTHALMOLOGY
End: 2024-11-15
Payer: COMMERCIAL

## 2024-11-15 DIAGNOSIS — Z94.7: ICD-10-CM

## 2024-11-15 DIAGNOSIS — H18.11: ICD-10-CM

## 2024-11-15 PROCEDURE — 99024 POSTOP FOLLOW-UP VISIT: CPT | Performed by: OPHTHALMOLOGY

## 2024-11-15 ASSESSMENT — REFRACTION_CURRENTRX
OD_VPRISM_DIRECTION: BF
OD_SPHERE: -4.00
OD_OVR_VA: 20/
OS_ADD: +3.75
OD_CYLINDER: -0.75
OS_OVR_VA: 20/
OS_CYLINDER: -1.00
OS_SPHERE: -4.00
OS_AXIS: 093
OS_VPRISM_DIRECTION: BF
OD_AXIS: 100
OD_ADD: +3.75

## 2024-11-15 ASSESSMENT — REFRACTION_MANIFEST
OS_AXIS: 175
OS_VA1: 20/50-2
OS_SPHERE: -3.50
OS_CYLINDER: +3.00

## 2024-11-15 ASSESSMENT — REFRACTION_AUTOREFRACTION
OS_AXIS: 016
OS_SPHERE: -4.00
OS_CYLINDER: +2.25

## 2024-11-15 ASSESSMENT — KERATOMETRY
METHOD_AUTO_MANUAL: AUTO
OS_AXISANGLE_DEGREES: 032
OS_K1POWER_DIOPTERS: 39.75
OS_K2POWER_DIOPTERS: 42.50

## 2024-11-15 ASSESSMENT — VISUAL ACUITY
OD_BCVA: 20/70+2
OS_BCVA: 20/300

## 2024-11-18 LAB
CULTURE RESULTS: SIGNIFICANT CHANGE UP
SPECIMEN SOURCE: SIGNIFICANT CHANGE UP

## 2024-11-19 ENCOUNTER — DOCTOR'S OFFICE (OUTPATIENT)
Facility: LOCATION | Age: 89
Setting detail: OPHTHALMOLOGY
End: 2024-11-19
Payer: COMMERCIAL

## 2024-11-19 DIAGNOSIS — Z94.7: ICD-10-CM

## 2024-11-19 PROCEDURE — 99024 POSTOP FOLLOW-UP VISIT: CPT | Performed by: OPHTHALMOLOGY

## 2024-11-19 ASSESSMENT — REFRACTION_AUTOREFRACTION
OS_SPHERE: -4.00
OS_AXIS: 016
OD_AXIS: 065
OS_CYLINDER: +3.00
OD_CYLINDER: +7.25
OD_SPHERE: -6.75

## 2024-11-19 ASSESSMENT — KERATOMETRY
OS_K1POWER_DIOPTERS: 40.75
OD_K2POWER_DIOPTERS: 42.50
OD_K1POWER_DIOPTERS: 40.00
OD_AXISANGLE_DEGREES: 018
OS_K2POWER_DIOPTERS: 43.25
METHOD_AUTO_MANUAL: AUTO
OS_AXISANGLE_DEGREES: 010

## 2024-11-19 ASSESSMENT — REFRACTION_MANIFEST
OS_VA1: 20/50-2
OS_SPHERE: -3.50
OS_AXIS: 175
OS_CYLINDER: +3.00

## 2024-11-19 ASSESSMENT — CONFRONTATIONAL VISUAL FIELD TEST (CVF)
OS_FINDINGS: FULL
OD_FINDINGS: FULL

## 2024-11-19 ASSESSMENT — REFRACTION_CURRENTRX
OD_OVR_VA: 20/
OD_CYLINDER: -0.75
OS_SPHERE: -4.00
OS_AXIS: 093
OS_OVR_VA: 20/
OD_ADD: +3.75
OD_AXIS: 100
OS_VPRISM_DIRECTION: BF
OD_SPHERE: -4.00
OS_CYLINDER: -1.00
OD_VPRISM_DIRECTION: BF
OS_ADD: +3.75

## 2024-11-19 ASSESSMENT — VISUAL ACUITY
OS_BCVA: 20/150
OD_BCVA: 20/70+2

## 2024-11-26 ENCOUNTER — DOCTOR'S OFFICE (OUTPATIENT)
Facility: LOCATION | Age: 89
Setting detail: OPHTHALMOLOGY
End: 2024-11-26
Payer: COMMERCIAL

## 2024-11-26 DIAGNOSIS — Z94.7: ICD-10-CM

## 2024-11-26 PROCEDURE — 99024 POSTOP FOLLOW-UP VISIT: CPT | Performed by: OPHTHALMOLOGY

## 2024-11-26 ASSESSMENT — REFRACTION_CURRENTRX
OS_VPRISM_DIRECTION: BF
OS_ADD: +3.75
OS_SPHERE: -4.00
OD_AXIS: 100
OS_CYLINDER: -1.00
OD_VPRISM_DIRECTION: BF
OS_AXIS: 093
OD_SPHERE: -4.00
OD_OVR_VA: 20/
OS_OVR_VA: 20/
OD_CYLINDER: -0.75
OD_ADD: +3.75

## 2024-11-26 ASSESSMENT — VISUAL ACUITY
OS_BCVA: 20/200
OD_BCVA: 20/100-1+1

## 2024-11-26 ASSESSMENT — REFRACTION_MANIFEST
OS_SPHERE: -3.50
OS_CYLINDER: +3.00
OS_AXIS: 175
OS_VA1: 20/50-2

## 2024-11-26 ASSESSMENT — REFRACTION_AUTOREFRACTION
OS_CYLINDER: +2.50
OS_AXIS: 012
OS_SPHERE: -3.50
OD_SPHERE: UNABLE

## 2024-11-26 ASSESSMENT — KERATOMETRY
OS_AXISANGLE_DEGREES: 011
METHOD_AUTO_MANUAL: AUTO
OS_K1POWER_DIOPTERS: 41.00
OS_K2POWER_DIOPTERS: 42.75
OD_K1POWER_DIOPTERS: UNABLE

## 2024-11-29 ENCOUNTER — DOCTOR'S OFFICE (OUTPATIENT)
Facility: LOCATION | Age: 89
Setting detail: OPHTHALMOLOGY
End: 2024-11-29
Payer: COMMERCIAL

## 2024-11-29 DIAGNOSIS — Z94.7: ICD-10-CM

## 2024-11-29 PROCEDURE — 99024 POSTOP FOLLOW-UP VISIT: CPT | Performed by: OPHTHALMOLOGY

## 2024-11-29 ASSESSMENT — REFRACTION_CURRENTRX
OD_SPHERE: -4.00
OD_ADD: +3.75
OS_OVR_VA: 20/
OD_CYLINDER: -0.75
OD_OVR_VA: 20/
OS_VPRISM_DIRECTION: BF
OD_VPRISM_DIRECTION: BF
OS_AXIS: 093
OS_CYLINDER: -1.00
OS_ADD: +3.75
OS_SPHERE: -4.00
OD_AXIS: 100

## 2024-11-29 ASSESSMENT — REFRACTION_AUTOREFRACTION
OS_SPHERE: -3.50
OS_AXIS: 012
OD_SPHERE: UNABLE
OS_CYLINDER: +2.50

## 2024-11-29 ASSESSMENT — KERATOMETRY
METHOD_AUTO_MANUAL: AUTO
OS_AXISANGLE_DEGREES: 008
OS_K2POWER_DIOPTERS: 43.50
OD_K1POWER_DIOPTERS: 42.00
OD_AXISANGLE_DEGREES: 014
OD_K2POWER_DIOPTERS: 44.75
OS_K1POWER_DIOPTERS: 40.75

## 2024-11-29 ASSESSMENT — TONOMETRY: OD_IOP_MMHG: 18

## 2024-11-29 ASSESSMENT — REFRACTION_MANIFEST
OS_CYLINDER: +3.00
OS_AXIS: 175
OS_SPHERE: -3.50
OS_VA1: 20/50-2

## 2024-11-29 ASSESSMENT — CONFRONTATIONAL VISUAL FIELD TEST (CVF)
OD_FINDINGS: FULL
OS_FINDINGS: FULL

## 2024-11-29 ASSESSMENT — VISUAL ACUITY
OS_BCVA: 20/200
OD_BCVA: 20/60

## 2024-12-02 PROBLEM — Z94.7 CORNEAL TRANSPLANT STATUS ; LEFT EYE: Status: ACTIVE | Noted: 2024-11-19

## 2024-12-02 PROBLEM — H18.11 BULLOUS KERATOPATHY; RIGHT EYE: Status: ACTIVE | Noted: 2024-11-05

## 2025-03-06 NOTE — ASU DISCHARGE PLAN (ADULT/PEDIATRIC) - PATIENT BELONGINGS
Patient's wife is informed of recommendations from PCP (ABRIL on file). Verbalizes understanding. Wife will call to schedule lab appointment. Aware to expect a call from GI to schedule.    Patient's belongings returned

## (undated) DEVICE — WARMING BLANKET LOWER ADULT

## (undated) DEVICE — NDL HYPO NONSAFE 30G X 0.5" (BEIGE)

## (undated) DEVICE — PACK OPTH CATARACT

## (undated) DEVICE — MARKING PEN DEVON DUAL TIP W RULER

## (undated) DEVICE — HOOK STRAIGHT SINKEY DISP

## (undated) DEVICE — SLEEVE INTREPID MICROSMOOTH ULTRA INFUSION 0.9MM (PINK)

## (undated) DEVICE — ELCTR ERASER BI-P BVL 45DEG 18G

## (undated) DEVICE — NDL REROBULBAR ATKINSON 23G X 1.5"

## (undated) DEVICE — VENODYNE/SCD SLEEVE CALF MEDIUM

## (undated) DEVICE — DRAPE 1/2 SHEET 40X57"

## (undated) DEVICE — NDL HYPO SAFE 25G X 5/8" (ORANGE)

## (undated) DEVICE — CANNULA IRR ALCON ANTERIOR CHAMBER 30G

## (undated) DEVICE — GLV 7 PROTEXIS (WHITE)

## (undated) DEVICE — SINSKEY IOL LENS MANIPULATOR STRAIGHT DISP

## (undated) DEVICE — SPONGE OPHTHALMIC ALCON SPEAR

## (undated) DEVICE — KNFE VITREC 20G

## (undated) DEVICE — SUT NYLON 10-0 12" CU-5

## (undated) DEVICE — ELCTR BIPOLAR CORD J&J 12FT DISP

## (undated) DEVICE — PACK OPHTHALMIC 30 BALANCE TIP CUSTOM 0.9MM

## (undated) DEVICE — LENS CONTACT BC 8.60 14D

## (undated) DEVICE — CAPSULE GUARD I/A